# Patient Record
Sex: MALE | Race: WHITE | Employment: FULL TIME | URBAN - METROPOLITAN AREA
[De-identification: names, ages, dates, MRNs, and addresses within clinical notes are randomized per-mention and may not be internally consistent; named-entity substitution may affect disease eponyms.]

---

## 2021-11-26 ENCOUNTER — TELEPHONE (OUTPATIENT)
Dept: INTERNAL MEDICINE | Age: 31
End: 2021-11-26

## 2021-11-26 ENCOUNTER — OFFICE VISIT (OUTPATIENT)
Dept: FAMILY MEDICINE CLINIC | Facility: CLINIC | Age: 31
End: 2021-11-26

## 2021-11-26 VITALS
RESPIRATION RATE: 16 BRPM | OXYGEN SATURATION: 98 % | HEART RATE: 62 BPM | TEMPERATURE: 99 F | HEIGHT: 73 IN | DIASTOLIC BLOOD PRESSURE: 70 MMHG | WEIGHT: 160 LBS | SYSTOLIC BLOOD PRESSURE: 130 MMHG | BODY MASS INDEX: 21.2 KG/M2

## 2021-11-26 DIAGNOSIS — R35.0 URINARY FREQUENCY: Primary | ICD-10-CM

## 2021-11-26 PROCEDURE — 3075F SYST BP GE 130 - 139MM HG: CPT | Performed by: PHYSICIAN ASSISTANT

## 2021-11-26 PROCEDURE — 99202 OFFICE O/P NEW SF 15 MIN: CPT | Performed by: PHYSICIAN ASSISTANT

## 2021-11-26 PROCEDURE — 3008F BODY MASS INDEX DOCD: CPT | Performed by: PHYSICIAN ASSISTANT

## 2021-11-26 PROCEDURE — 3078F DIAST BP <80 MM HG: CPT | Performed by: PHYSICIAN ASSISTANT

## 2021-11-26 PROCEDURE — 81003 URINALYSIS AUTO W/O SCOPE: CPT | Performed by: PHYSICIAN ASSISTANT

## 2021-11-26 NOTE — PROGRESS NOTES
CHIEF COMPLAINT:   Patient presents with:  UTI    HPI:   Jovan Solis is a 33year old male who presents with symptoms of UTI. Complaining of urinary frequency, urgency, and intermittent dysuria for the last 1 week.  Symptoms have been worsening since Bilirubin Urine Negative Negative    Ketones, UA Negative Negative - Trace mg/dL    Spec Gravity 1.020 1.005 - 1.030    Blood Urine Negative Negative    PH Urine 6.0 5.0 - 8.0    Protein Urine Negative Negative - Trace mg/dL    Urobilinogen Urine 0.2 0.

## 2021-12-01 ENCOUNTER — APPOINTMENT (OUTPATIENT)
Dept: INTERNAL MEDICINE | Age: 31
End: 2021-12-01

## 2021-12-03 ENCOUNTER — OFFICE VISIT (OUTPATIENT)
Dept: FAMILY MEDICINE CLINIC | Facility: CLINIC | Age: 31
End: 2021-12-03

## 2021-12-03 VITALS
BODY MASS INDEX: 22.05 KG/M2 | SYSTOLIC BLOOD PRESSURE: 134 MMHG | RESPIRATION RATE: 19 BRPM | OXYGEN SATURATION: 98 % | TEMPERATURE: 99 F | HEIGHT: 70 IN | HEART RATE: 56 BPM | DIASTOLIC BLOOD PRESSURE: 80 MMHG | WEIGHT: 154 LBS

## 2021-12-03 DIAGNOSIS — N13.9 URINARY OBSTRUCTION: ICD-10-CM

## 2021-12-03 DIAGNOSIS — K59.03 DRUG-INDUCED CONSTIPATION: Primary | ICD-10-CM

## 2021-12-03 PROCEDURE — 3079F DIAST BP 80-89 MM HG: CPT | Performed by: FAMILY MEDICINE

## 2021-12-03 PROCEDURE — 3008F BODY MASS INDEX DOCD: CPT | Performed by: FAMILY MEDICINE

## 2021-12-03 PROCEDURE — 3075F SYST BP GE 130 - 139MM HG: CPT | Performed by: FAMILY MEDICINE

## 2021-12-03 PROCEDURE — 99203 OFFICE O/P NEW LOW 30 MIN: CPT | Performed by: FAMILY MEDICINE

## 2021-12-03 NOTE — PROGRESS NOTES
Chicho Pickard is a 33year old male. CC:  Patient presents with:  Establish Care: per pt- possible kidney stone, urgency to go, lower back pain      HPI:  Urinary issues for at least a week.  He has difficulty starting a urinary stream and can have cyanosis or edema  RECTAL: not examined  GENITAL: not examined  LYMPH: no supraclavicular nodes  MUSCULOSKELETAL: normal ambulation  NEURO: Awake and alert. Normal speech and articulation. No facial droop or asymmetry. Moving all extremities equally.     Co

## 2021-12-07 ENCOUNTER — TELEPHONE (OUTPATIENT)
Dept: FAMILY MEDICINE CLINIC | Facility: CLINIC | Age: 31
End: 2021-12-07

## 2021-12-07 NOTE — TELEPHONE ENCOUNTER
Pt called he seen the doctor on 12/3 and they came up with a treatment plan. He is wanting to know if he should continue with the plan or what doctor recommends to do?  Pt would like to speak with nurse

## 2021-12-07 NOTE — TELEPHONE ENCOUNTER
Patient states that he has been doing the miralax and mineral oil. He has done 3 fleets enema's. He has been drinking as much water as he can. He still has not had a significant bowel movement.    He also states that he stopped his medications just for

## 2021-12-07 NOTE — TELEPHONE ENCOUNTER
Did he have his girlfriend or family member try and disimpact him? If not, then that is the next option. The other thing to do would be go to ER where a disimpaction can be done.  Lastly, he could see Dr. Indy Ba, our surgeon to see if a disimpaction can be d

## 2021-12-07 NOTE — TELEPHONE ENCOUNTER
Patient advised of the information per Dr. Seema Mayo. Patient will try the regimen prescribed by  for another day. He is trying to not go to the ER since he is unvaccinated.

## 2022-05-30 ENCOUNTER — TELEMEDICINE (OUTPATIENT)
Dept: TELEHEALTH | Age: 32
End: 2022-05-30

## 2022-05-30 DIAGNOSIS — R11.0 NAUSEA: Primary | ICD-10-CM

## 2022-05-30 NOTE — PROGRESS NOTES
Pt requested Video visit for 5:30pm on [facility] Day. Notified that we were closed. Brief phone call for his nausea of past 2 days. Denies vomiting or fever or abd/back pain. Go to Emergency Dept if worsen or go to Immediate Care site in morning at or after 8am, if no improvement. Can try the BRAT diet and small amounts of clear fluids. OTC Meclizine might help.  No charge for 4 min call

## 2022-05-31 ENCOUNTER — OFFICE VISIT (OUTPATIENT)
Dept: FAMILY MEDICINE CLINIC | Facility: CLINIC | Age: 32
End: 2022-05-31

## 2022-05-31 VITALS
TEMPERATURE: 97 F | HEART RATE: 35 BPM | SYSTOLIC BLOOD PRESSURE: 100 MMHG | WEIGHT: 159.81 LBS | DIASTOLIC BLOOD PRESSURE: 60 MMHG | HEIGHT: 70 IN | BODY MASS INDEX: 22.88 KG/M2 | OXYGEN SATURATION: 98 %

## 2022-05-31 DIAGNOSIS — I95.9 HYPOTENSION, UNSPECIFIED HYPOTENSION TYPE: ICD-10-CM

## 2022-05-31 DIAGNOSIS — R11.0 NAUSEA: Primary | ICD-10-CM

## 2022-05-31 DIAGNOSIS — R00.1 BRADYCARDIA: ICD-10-CM

## 2022-05-31 PROCEDURE — 3074F SYST BP LT 130 MM HG: CPT | Performed by: FAMILY MEDICINE

## 2022-05-31 PROCEDURE — 3008F BODY MASS INDEX DOCD: CPT | Performed by: FAMILY MEDICINE

## 2022-05-31 PROCEDURE — 3078F DIAST BP <80 MM HG: CPT | Performed by: FAMILY MEDICINE

## 2022-06-24 ENCOUNTER — OFFICE VISIT (OUTPATIENT)
Dept: FAMILY MEDICINE CLINIC | Facility: CLINIC | Age: 32
End: 2022-06-24

## 2022-06-24 VITALS
TEMPERATURE: 97 F | SYSTOLIC BLOOD PRESSURE: 132 MMHG | DIASTOLIC BLOOD PRESSURE: 70 MMHG | HEIGHT: 72 IN | BODY MASS INDEX: 22.35 KG/M2 | OXYGEN SATURATION: 99 % | WEIGHT: 165 LBS | HEART RATE: 57 BPM

## 2022-06-24 DIAGNOSIS — K59.03 DRUG-INDUCED CONSTIPATION: ICD-10-CM

## 2022-06-24 DIAGNOSIS — R79.89 ELEVATED LFTS: Primary | ICD-10-CM

## 2022-06-24 LAB
ALT SERPL-CCNC: 68 U/L
AST SERPL-CCNC: 32 U/L (ref 15–37)

## 2022-06-24 PROCEDURE — 84450 TRANSFERASE (AST) (SGOT): CPT | Performed by: FAMILY MEDICINE

## 2022-06-24 PROCEDURE — 3078F DIAST BP <80 MM HG: CPT | Performed by: FAMILY MEDICINE

## 2022-06-24 PROCEDURE — 84460 ALANINE AMINO (ALT) (SGPT): CPT | Performed by: FAMILY MEDICINE

## 2022-06-24 PROCEDURE — 3008F BODY MASS INDEX DOCD: CPT | Performed by: FAMILY MEDICINE

## 2022-06-24 PROCEDURE — 3075F SYST BP GE 130 - 139MM HG: CPT | Performed by: FAMILY MEDICINE

## 2022-06-24 PROCEDURE — 99213 OFFICE O/P EST LOW 20 MIN: CPT | Performed by: FAMILY MEDICINE

## 2022-06-24 RX ORDER — ESCITALOPRAM OXALATE 10 MG/1
10 TABLET ORAL DAILY
COMMUNITY
Start: 2022-06-01 | End: 2022-06-24

## 2022-06-24 RX ORDER — ESCITALOPRAM OXALATE 10 MG/1
10 TABLET ORAL DAILY
COMMUNITY
Start: 2022-06-01

## 2022-06-28 ENCOUNTER — TELEPHONE (OUTPATIENT)
Dept: FAMILY MEDICINE CLINIC | Facility: CLINIC | Age: 32
End: 2022-06-28

## 2022-06-28 RX ORDER — ESCITALOPRAM OXALATE 10 MG/1
10 TABLET ORAL DAILY
Qty: 90 TABLET | Refills: 0 | Status: SHIPPED | OUTPATIENT
Start: 2022-06-28

## 2022-06-28 NOTE — TELEPHONE ENCOUNTER
Patient states that he stopped the Lexparo on Friday. He was on it for about 2 1/2 week and did not feel like it was working so he wanted to get off of it. He states that ever since Saturday he has been feeling very anxious/panicky. Has been crying for no reason. He did start the medication again yesterday. He took one dose yesterday and one today. Wants to know if he is doing the right thing. Patient was advised that the medication does not work immediately.

## 2022-06-28 NOTE — TELEPHONE ENCOUNTER
Patient advised of the information. Follow up appointment made. Patient will need a refill sent to [facility] and . No need to call patient back.

## 2022-06-28 NOTE — TELEPHONE ENCOUNTER
PATIENT WAS ADVISED TO STOP THE Phuong Silvan. SINCE THEN PATIENT FEELS OFF. CRYING SPELLS, CHEST PRESSURE. PATIENT SOUNDS VERY PANICKY.

## 2024-06-20 ENCOUNTER — OFFICE VISIT (OUTPATIENT)
Facility: LOCATION | Age: 34
End: 2024-06-20

## 2024-06-20 DIAGNOSIS — H93.293 ABNORMAL AUDITORY PERCEPTION OF BOTH EARS: ICD-10-CM

## 2024-06-20 DIAGNOSIS — H61.23 CERUMEN DEBRIS ON TYMPANIC MEMBRANE OF BOTH EARS: Primary | ICD-10-CM

## 2024-06-20 PROCEDURE — 99203 OFFICE O/P NEW LOW 30 MIN: CPT | Performed by: OTOLARYNGOLOGY

## 2024-06-20 NOTE — PROGRESS NOTES
Gary Acevedo is a 34 year old male.   Chief Complaint   Patient presents with    Cerumen Impaction     HPI:   Wax impactions.  He and his girlfriend have tried to get them cleared at home.  It causes problems with hearing.  There is no pain.  Current Outpatient Medications   Medication Sig Dispense Refill    escitalopram 10 MG Oral Tab Take 1 tablet (10 mg total) by mouth daily. 90 tablet 0      History reviewed. No pertinent past medical history.   Social History:  Social History     Socioeconomic History    Marital status: Single   Tobacco Use    Smoking status: Former    Smokeless tobacco: Never   Vaping Use    Vaping status: Every Day   Substance and Sexual Activity    Alcohol use: Not Currently    Drug use: Yes     Types: Cannabis     Social Determinants of Health      Received from [facility]    Housing Stability      History reviewed. No pertinent surgical history.      REVIEW OF SYSTEMS:   GENERAL HEALTH: feels well otherwise  GENERAL : denies fever, chills, sweats, weight loss, weight gain  SKIN: denies any unusual skin lesions or rashes  RESPIRATORY: denies shortness of breath with exertion  NEURO: denies headaches    EXAM:   There were no vitals taken for this visit.    System Findings Details   Constitutional  Overall appearance - Normal.   Psychiatric  Orientation - Oriented to time, place, person & situation. Appropriate mood and affect.   Head/Face  Facial features -- Normal. Skull - Normal.   Eyes  Pupils equal ,round ,react to light and accomidate   Ears, Nose, Throat, Neck  Small ear canals bilaterally with wax impactions removed today under the microscope otherwise no signs of external otitis nor tympanic membrane perforation or infection.   Neurological  Memory - Normal. Cranial nerves - Cranial nerves II through XII grossly intact.   Lymph Detail  Submental. Submandibular. Anterior cervical. Posterior cervical. Supraclavicular.       ASSESSMENT AND PLAN:   1. Cerumen  debris on tympanic membrane of both ears  Removed today    2. Abnormal auditory perception of both ears  Wax is likely the cause.  He will see me back for any further problems.      The patient indicates understanding of these issues and agrees to the plan.    No follow-ups on file.    Ulysses Polk MD  6/20/2024  2:24 PM